# Patient Record
Sex: FEMALE | URBAN - NONMETROPOLITAN AREA
[De-identification: names, ages, dates, MRNs, and addresses within clinical notes are randomized per-mention and may not be internally consistent; named-entity substitution may affect disease eponyms.]

---

## 2021-04-29 ENCOUNTER — APPOINTMENT (RX ONLY)
Dept: URBAN - NONMETROPOLITAN AREA CLINIC 6 | Facility: CLINIC | Age: 31
Setting detail: DERMATOLOGY
End: 2021-04-29

## 2021-04-29 DIAGNOSIS — C50 MALIGNANT NEOPLASM OF BREAST: ICD-10-CM

## 2021-04-29 PROBLEM — D48.5 NEOPLASM OF UNCERTAIN BEHAVIOR OF SKIN: Status: ACTIVE | Noted: 2021-04-29

## 2021-04-29 PROCEDURE — 99202 OFFICE O/P NEW SF 15 MIN: CPT

## 2021-04-29 PROCEDURE — ? COUNSELING

## 2021-04-29 PROCEDURE — ? ADDITIONAL NOTES

## 2021-04-29 NOTE — PROCEDURE: ADDITIONAL NOTES
Additional Notes: Discussed options with pt including obtaining a skin biopsy today vs referral to a breast surgeon for evaluation and management.  Dr. Ortega and I discussed doing a biopsy of the inflamed portion of the right nipple, but we were not confident that this would be diagnostic of the entire process going on.  Pt elected to be referred to a breast surgeon for consultation, but prefers to avoid [facility] if possible.  We will work on a referral, but may consider bringing pt back to the office for a punch biopsy.  Pt had to leave to go  her kids today and couldn’t stay until we were able to get in contact with her PCP.
Render Risk Assessment In Note?: yes
Detail Level: Simple

## 2021-04-29 NOTE — HPI: BODY LOCATION - TRUNK
How Severe Is Your Condition?: moderate
Additional History: She had a recent mammogram and ultrasound with negative findings in March 2021.  Pt was referred by her PCP, Dr. Hastings for further evaluation.  She states she had a normal mammogram 1 year ago at [facility] in [place].  She was dissatisfied with their management of her concerns and was just told that her imaging was normal and there was nothing more than needed to be done.

## 2022-03-07 ENCOUNTER — APPOINTMENT (RX ONLY)
Dept: URBAN - NONMETROPOLITAN AREA CLINIC 6 | Facility: CLINIC | Age: 32
Setting detail: DERMATOLOGY
End: 2022-03-07

## 2022-03-07 DIAGNOSIS — D22 MELANOCYTIC NEVI: ICD-10-CM

## 2022-03-07 DIAGNOSIS — Z71.89 OTHER SPECIFIED COUNSELING: ICD-10-CM

## 2022-03-07 DIAGNOSIS — L13.0 DERMATITIS HERPETIFORMIS: ICD-10-CM

## 2022-03-07 PROBLEM — D22.5 MELANOCYTIC NEVI OF TRUNK: Status: ACTIVE | Noted: 2022-03-07

## 2022-03-07 PROCEDURE — ? COUNSELING

## 2022-03-07 PROCEDURE — ? BIOPSY BY SHAVE METHOD

## 2022-03-07 PROCEDURE — ? PRESCRIPTION

## 2022-03-07 PROCEDURE — 11102 TANGNTL BX SKIN SINGLE LES: CPT

## 2022-03-07 PROCEDURE — 99213 OFFICE O/P EST LOW 20 MIN: CPT | Mod: 25

## 2022-03-07 PROCEDURE — ? TREATMENT REGIMEN

## 2022-03-07 RX ORDER — TRIAMCINOLONE ACETONIDE 1 MG/G
CREAM TOPICAL
Qty: 30 | Refills: 3 | Status: ERX | COMMUNITY
Start: 2022-03-07

## 2022-03-07 RX ADMIN — TRIAMCINOLONE ACETONIDE: 1 CREAM TOPICAL at 00:00

## 2022-03-07 ASSESSMENT — LOCATION ZONE DERM
LOCATION ZONE: TRUNK
LOCATION ZONE: FINGER

## 2022-03-07 ASSESSMENT — LOCATION SIMPLE DESCRIPTION DERM
LOCATION SIMPLE: LOWER BACK
LOCATION SIMPLE: RIGHT MIDDLE FINGER
LOCATION SIMPLE: UPPER BACK

## 2022-03-07 ASSESSMENT — LOCATION DETAILED DESCRIPTION DERM
LOCATION DETAILED: RIGHT PROXIMAL ULNAR DORSAL MIDDLE FINGER
LOCATION DETAILED: SUPERIOR THORACIC SPINE
LOCATION DETAILED: SUPERIOR LUMBAR SPINE

## 2022-03-07 NOTE — PROCEDURE: TREATMENT REGIMEN
Initiate Treatment: Triamcinolone cream BID 2 weeks on, 1 week off
Continue Regimen: Claritin in the morning and Zyrtec at night
Plan: Pt was diagnosed with celiac disease two years ago and since has noticed that she has had very sensitive skin.  Continue gluten free diet, antihistamines, and fragrance free products.  Follow us as needed.
Detail Level: Zone

## 2022-03-07 NOTE — PROCEDURE: BIOPSY BY SHAVE METHOD
Bill For Surgical Tray: no
X Size Of Lesion In Cm: 0
Depth Of Biopsy: dermis
Cryotherapy Text: The wound bed was treated with cryotherapy after the biopsy was performed.
Anesthesia Type: 1% lidocaine with epinephrine and a 1:10 solution of 8.4% sodium bicarbonate
Was A Bandage Applied: Yes
Hemostasis: Drysol
Notification Instructions: Patient will be notified of biopsy results. However, patient instructed to call the office if not contacted within 2 weeks.
Path Notes (To The Dermatopathologist): Please check margins.
Biopsy Method: Personna blade
Anesthesia Volume In Cc: 0.5
Electrodesiccation And Curettage Text: The wound bed was treated with electrodesiccation and curettage after the biopsy was performed.
Post-Care Instructions: I reviewed with the patient in detail post-care instructions. Patient is to keep the biopsy site dry overnight, and then wash with soap and water.  Apply Vaseline and a new bandaid twice daily until healed.
Consent: Written consent was obtained and risks were reviewed including but not limited to scarring, infection, bleeding, scabbing, incomplete removal, nerve damage and allergy to anesthesia.
Wound Care: Vaseline
Curettage Text: The wound bed was treated with curettage after the biopsy was performed.
Billing Type: Third-Party Bill
Biopsy Type: H and E
Size Of Lesion In Cm: 0.6
Silver Nitrate Text: The wound bed was treated with silver nitrate after the biopsy was performed.
Dressing: bandage
Detail Level: Detailed
Electrodesiccation Text: The wound bed was treated with electrodesiccation after the biopsy was performed.
Information: Selecting Yes will display possible errors in your note based on the variables you have selected. This validation is only offered as a suggestion for you. PLEASE NOTE THAT THE VALIDATION TEXT WILL BE REMOVED WHEN YOU FINALIZE YOUR NOTE. IF YOU WANT TO FAX A PRELIMINARY NOTE YOU WILL NEED TO TOGGLE THIS TO 'NO' IF YOU DO NOT WANT IT IN YOUR FAXED NOTE.
Type Of Destruction Used: Electrodesiccation

## 2022-03-07 NOTE — HPI: RASH
What Type Of Note Output Would You Prefer (Optional)?: Standard Output
How Severe Is Your Rash?: moderate
Is This A New Presentation, Or A Follow-Up?: Rash
Additional History: Bar soap, tide, renew lotion.  She has Celiac’s disease and finds this area and some spots around her neck flare up when she even breathes at the bakery or eats anything containing gluten.

## 2022-08-02 NOTE — HPI: EVALUATION OF SKIN LESION(S)
Patient hasn't seen [facility] since 4/9/19 and Dr. Spann since 4/16/20. So she should really be seen by a cardiologist. Dr. Maciel has NPT opening this Friday    FW to reception to please offer NPT appt with Dr. Maciel since Dr. Spann is out of office. Thank you  
What Type Of Note Output Would You Prefer (Optional)?: Standard Output
Hpi Title: Evaluation of Skin Lesions
How Severe Are Your Spot(S)?: moderate
Have Your Spot(S) Been Treated In The Past?: has not been treated